# Patient Record
Sex: FEMALE | Race: WHITE | NOT HISPANIC OR LATINO | Employment: OTHER | URBAN - METROPOLITAN AREA
[De-identification: names, ages, dates, MRNs, and addresses within clinical notes are randomized per-mention and may not be internally consistent; named-entity substitution may affect disease eponyms.]

---

## 2019-12-23 ENCOUNTER — RESULTS ENCOUNTER (OUTPATIENT)
Dept: PAIN MEDICINE | Facility: CLINIC | Age: 84
End: 2019-12-23

## 2019-12-23 ENCOUNTER — OFFICE VISIT (OUTPATIENT)
Dept: PAIN MEDICINE | Facility: CLINIC | Age: 84
End: 2019-12-23

## 2019-12-23 VITALS
TEMPERATURE: 98.6 F | RESPIRATION RATE: 16 BRPM | HEART RATE: 83 BPM | SYSTOLIC BLOOD PRESSURE: 144 MMHG | DIASTOLIC BLOOD PRESSURE: 77 MMHG | HEIGHT: 64 IN | BODY MASS INDEX: 26.5 KG/M2 | WEIGHT: 155.2 LBS | OXYGEN SATURATION: 93 %

## 2019-12-23 DIAGNOSIS — M47.816 LUMBAR FACET ARTHROPATHY: ICD-10-CM

## 2019-12-23 DIAGNOSIS — G89.29 OTHER CHRONIC PAIN: Primary | ICD-10-CM

## 2019-12-23 DIAGNOSIS — M51.37 DEGENERATION OF LUMBAR OR LUMBOSACRAL INTERVERTEBRAL DISC: ICD-10-CM

## 2019-12-23 DIAGNOSIS — G89.29 OTHER CHRONIC PAIN: ICD-10-CM

## 2019-12-23 LAB
POC AMPHETAMINES: NEGATIVE
POC BARBITURATES: NEGATIVE
POC BENZODIAZEPHINES: NEGATIVE
POC COCAINE: NEGATIVE
POC METHADONE: NEGATIVE
POC METHAMPHETAMINE SCREEN URINE: NEGATIVE
POC OPIATES: NEGATIVE
POC OXYCODONE: NEGATIVE
POC PHENCYCLIDINE: NEGATIVE
POC PROPOXYPHENE: NEGATIVE
POC THC: NEGATIVE
POC TRICYCLIC ANTIDEPRESSANTS: NEGATIVE

## 2019-12-23 PROCEDURE — 80305 DRUG TEST PRSMV DIR OPT OBS: CPT | Performed by: NURSE PRACTITIONER

## 2019-12-23 PROCEDURE — 99204 OFFICE O/P NEW MOD 45 MIN: CPT | Performed by: NURSE PRACTITIONER

## 2019-12-23 NOTE — PATIENT INSTRUCTIONS
"-------  Education about Medial Branch Blockade and RF Therapy:    This medial branch blockade (MBB) suggested is intended for diagnostic purposes, with the intent of offering the patient Radiofrequency thermal rhizotomy (RF) if the MBB is diagnostically effective.  The diagnostic blockade is necessary to determine the likelihood that RF therapy could be efficacious in providing long term relief to the patient.    Medial branches are sensory nerve branches that connect to a facet joint and transmit sensations & pain signals from that joint.  Facet is a term for the type of joints found in the spine.  Medial branches are the nerves that go to a facet, and therefore are also sometimes called \"facet joint nerves\" (FJNs).      In a medial branch blockade procedure, xray fluoroscopy is used to verify the locations of the outside of the joint lines which are being targeted.  Under xray guidance, needles are placed to these areas.  Contrast dye is injected to confirm proper placement, with dye flowing over the joint area, and to ensure that the dye does not flow into unintended areas such as a vein.  When this is confirmed, local anesthetic is injected to block the medial branch at that joint level.      If MBBs are diagnostically successful in blocking pain, then the patient is most likely a great candidate for Radiofrequency of those facet joint nerves.  In the RF procedure, needles are placed to the joint lines in the same fashion, and after testing, the needle tips are heated to thermally treat the nerves, blocking the nerves by in essence damaging the nerves with the heat treatment.       Medically, a successful RF procedure should provide a patient with 50% pain relief or more for at least 6 months.  Clinical experience suggests that successful patients receive relief more in the range of 12 months on average.  We also discussed that a fortunate minority of patients receive therapeutic success from the MBB, and may " not require RF ablation.  If a patient receives more than 8 weeks of relief from MBB, then occasional repeat MBB for therapeutic purposes is a very reasonable alternative therapy.  This course of therapy is consistent with our LCDs according to our CMS  in the area, and therefore other insurance providers should follow accordingly.  We will monitor our patients to screen for these therapeutic responders and will offer RF therapy only when necessary.      We discussed that MBB & RF are not without risks.  Guidelines regarding anticoagulant use & neuraxial procedures will be respected.  Patients that are ill or otherwise may be at risk for sepsis will not have their spines accessed by neuraxial injections of any type.  This patient will not be offered these therapies if there is an increased risk.   We discussed that there is a risk of postprocedural pain and also a risk of worsening of clinical picture with these procedures as with any neuraxial procedure.    -------

## 2019-12-23 NOTE — PROGRESS NOTES
"CHIEF COMPLAINT  F/U back and hip pain- patient states that she has this pain for many years that has progressively worsened. She describes the pain as stabbing across her back with some stinging and burning down bilateral legs. Patient has been to PT and another pain management with [place] many years ago. She uses heat and ice to relieve pain and takes tylenol daily to diminish the pain.     Subjective   Esther Major is a 88 y.o. female.   She presents to the office for evaluation of back and hip pain. She was referred here by  [facility] (Dr. Reid).     Patient referred to our practice for low back and hip pain.  This pain is chronic is been present for many years, but has become progressively worse recently.  Has been through PT on multiple occasions.  Has had massage therapy.  Past pain management at [facility], had lumbar DENIS's (would have been at least 15 years ago).  She recalls benefit with the epidurals.  She is referred here to consider interventional options.      C/o bilateral low back and lumbosacral area pain, the pain radiates to bilateral thighs.  The pain today is 8/10 in severity. She states that the pain is always present, \"everything\" aggravates it - standing, sitting, lying down.  Sometimes lying down will help after prolonged standing.  She uses topical bio-freeze and Tylenol without much relief.  Says she was on pain medications for years including Hydrocodone, Tramadol, but nothing worked so she stopped taking it.      Recent left intra-articular hip injection, says NO benefit.      History of neck surgery in the 1960's.      Patient is retired.  Denies use of tobacco, alcohol, illicit substances.      History of Present Illness     PEG Assessment   What number best describes your pain on average in the past week?9  What number best describes how, during the past week, pain has interfered with your enjoyment of life?2  What number best describes how, during the past week, pain " has interfered with your general activity?  2      Current Outpatient Medications:   •  acetaminophen (TYLENOL) 650 MG 8 hr tablet, Take 650 mg by mouth 4 (four) times a day., Disp: , Rfl:   •  DULoxetine (CYMBALTA) 60 MG capsule, Take 60 mg by mouth daily., Disp: , Rfl:   •  enalapril (VASOTEC) 10 MG tablet, Take 10 mg by mouth daily., Disp: , Rfl:   •  ezetimibe (ZETIA) 10 MG tablet, Take 10 mg by mouth daily., Disp: , Rfl:   •  fenofibrate (TRICOR) 145 MG tablet, Take 145 mg by mouth daily., Disp: , Rfl:   •  levothyroxine (SYNTHROID, LEVOTHROID) 50 MCG tablet, Take 50 mcg by mouth daily., Disp: , Rfl:   •  Misc Natural Products (CALCIUM PLUS ADVANCED PO), Take  by mouth., Disp: , Rfl:   •  Multiple Vitamins-Minerals (CENTRUM SILVER ADULT 50+ PO), Take  by mouth., Disp: , Rfl:   •  Omega-3 Fatty Acids (FISH OIL) 1000 MG capsule capsule, Take 1,500 capsules by mouth daily with breakfast., Disp: , Rfl:   •  omeprazole (PriLOSEC) 20 MG capsule, Take 20 mg by mouth daily., Disp: , Rfl:   •  Probiotic Product (PROBIOTIC ADVANCED PO), Take  by mouth., Disp: , Rfl:     The following portions of the patient's history were reviewed and updated as appropriate: allergies, current medications, past family history, past medical history, past social history, past surgical history and problem list.    REVIEW OF PERTINENT MEDICAL DATA  Patient was referred to our practice for chronic low back pain.  I reviewed her referral packet which was sent over 12/11/2019 and scanned in the media section.  I reviewed the office note from [facility] physicians neurosurgery rehab encounter date 12/10/2019 where patient was following up on her low back pain after physical therapy and x-rays were ordered.  She reported no benefit from the physical therapy and that her pain was severe at a 10 out of 10 in severity.  X-rays of her hip showed nothing acute and x-rays of lumbar spine showed moderate degenerative changes.  She had a left intra-articular  "hip injection performed at that visit.  Recommend referral to pain management for evaluation of low back pain for possible facet component.            LAKHANI = 6    Review of Systems   Constitutional: Positive for activity change (decreased) and fatigue. Negative for chills and fever.   Respiratory: Negative for chest tightness and shortness of breath.    Cardiovascular: Negative for chest pain.   Gastrointestinal: Positive for abdominal pain and diarrhea. Negative for constipation.   Genitourinary: Negative for difficulty urinating, dyspareunia and dysuria.   Musculoskeletal: Positive for arthralgias and back pain.   Neurological: Positive for weakness. Negative for dizziness, light-headedness, numbness and headaches.   Psychiatric/Behavioral: Positive for agitation and sleep disturbance. Negative for self-injury and suicidal ideas. The patient is not nervous/anxious.      Vitals:    12/23/19 0805   BP: 144/77   Pulse: 83   Resp: 16   Temp: 98.6 °F (37 °C)   SpO2: 93%   Weight: 70.4 kg (155 lb 3.2 oz)   Height: 162.6 cm (64\")   PainSc:   8   PainLoc: Back     Objective   Physical Exam   Constitutional: She is oriented to person, place, and time. She appears well-developed and well-nourished. She is cooperative. No distress.   HENT:   Head: Normocephalic and atraumatic.   Nose: Nose normal.   Eyes: Pupils are equal, round, and reactive to light. Conjunctivae and lids are normal.   Neck: Trachea normal and normal range of motion. Neck supple.   Cardiovascular: Normal rate, regular rhythm and normal heart sounds.   Pulmonary/Chest: Effort normal and breath sounds normal. No respiratory distress.   Abdominal: Soft. Normal appearance and bowel sounds are normal. There is no tenderness. There is no guarding.   Musculoskeletal: She exhibits no deformity.        Lumbar back: She exhibits decreased range of motion, tenderness, bony tenderness and pain.   +lumbar facet tenderness/loading    Neurological: She is alert and " "oriented to person, place, and time. She has normal strength and normal reflexes. No cranial nerve deficit. Coordination and gait normal.   Reflex Scores:       Patellar reflexes are 2+ on the right side and 2+ on the left side.       Achilles reflexes are 2+ on the right side and 2+ on the left side.  Skin: Skin is warm, dry and intact. She is not diaphoretic.   Psychiatric: She has a normal mood and affect. Her speech is normal and behavior is normal. Judgment and thought content normal. Cognition and memory are normal.   Nursing note and vitals reviewed.    Assessment/Plan   Esther was seen today for back pain.    Diagnoses and all orders for this visit:    Other chronic pain    Lumbar facet arthropathy  -     Case Request    Degeneration of lumbar or lumbosacral intervertebral disc      --- Bilateral L2-L5 MBB X 2, 2-4 weeks apart  -------  Education about Medial Branch Blockade and RF Therapy:    This medial branch blockade (MBB) suggested is intended for diagnostic purposes, with the intent of offering the patient Radiofrequency thermal rhizotomy (RF) if the MBB is diagnostically effective.  The diagnostic blockade is necessary to determine the likelihood that RF therapy could be efficacious in providing long term relief to the patient.    Medial branches are sensory nerve branches that connect to a facet joint and transmit sensations & pain signals from that joint.  Facet is a term for the type of joints found in the spine.  Medial branches are the nerves that go to a facet, and therefore are also sometimes called \"facet joint nerves\" (FJNs).      In a medial branch blockade procedure, xray fluoroscopy is used to verify the locations of the outside of the joint lines which are being targeted.  Under xray guidance, needles are placed to these areas.  Contrast dye is injected to confirm proper placement, with dye flowing over the joint area, and to ensure that the dye does not flow into unintended areas such as " a vein.  When this is confirmed, local anesthetic is injected to block the medial branch at that joint level.      If MBBs are diagnostically successful in blocking pain, then the patient is most likely a great candidate for Radiofrequency of those facet joint nerves.  In the RF procedure, needles are placed to the joint lines in the same fashion, and after testing, the needle tips are heated to thermally treat the nerves, blocking the nerves by in essence damaging the nerves with the heat treatment.       Medically, a successful RF procedure should provide a patient with 50% pain relief or more for at least 6 months.  Clinical experience suggests that successful patients receive relief more in the range of 12 months on average.  We also discussed that a fortunate minority of patients receive therapeutic success from the MBB, and may not require RF ablation.  If a patient receives more than 8 weeks of relief from MBB, then occasional repeat MBB for therapeutic purposes is a very reasonable alternative therapy.  This course of therapy is consistent with our LCDs according to our CMS  in the area, and therefore other insurance providers should follow accordingly.  We will monitor our patients to screen for these therapeutic responders and will offer RF therapy only when necessary.      We discussed that MBB & RF are not without risks.  Guidelines regarding anticoagulant use & neuraxial procedures will be respected.  Patients that are ill or otherwise may be at risk for sepsis will not have their spines accessed by neuraxial injections of any type.  This patient will not be offered these therapies if there is an increased risk.   We discussed that there is a risk of postprocedural pain and also a risk of worsening of clinical picture with these procedures as with any neuraxial procedure.    -------    --- Routine UDS in office today as part of new patient evaluation.  The specimen was viewed and the  immunoassay result reviewed and is NEG.  This specimen will be sent to [facility] laboratory for confirmation.     --- May need SI joint injections in the future   --- Follow-up after procedure         YOLI REPORT  YOLI report has been reviewed and scanned into the patient's chart.    As the clinician, I personally reviewed the YOLI from 12/23/19 while the patient was in the office today.    EMR Dragon/Transcription disclaimer:   Much of this encounter note is an electronic transcription/translation of spoken language to printed text. The electronic translation of spoken language may permit erroneous, or at times, nonsensical words or phrases to be inadvertently transcribed; Although I have reviewed the note for such errors, some may still exist.

## 2020-03-12 ENCOUNTER — DOCUMENTATION (OUTPATIENT)
Dept: PAIN MEDICINE | Facility: CLINIC | Age: 85
End: 2020-03-12

## 2020-03-12 ENCOUNTER — OUTSIDE FACILITY SERVICE (OUTPATIENT)
Dept: PAIN MEDICINE | Facility: CLINIC | Age: 85
End: 2020-03-12

## 2020-03-12 PROCEDURE — 64495 INJ PARAVERT F JNT L/S 3 LEV: CPT | Performed by: ANESTHESIOLOGY

## 2020-03-12 PROCEDURE — 64493 INJ PARAVERT F JNT L/S 1 LEV: CPT | Performed by: ANESTHESIOLOGY

## 2020-03-12 PROCEDURE — 64494 INJ PARAVERT F JNT L/S 2 LEV: CPT | Performed by: ANESTHESIOLOGY

## 2020-03-13 NOTE — PROGRESS NOTES
Bilateral L2-5 Lumbar Medial Branch Blockade  [facility]    PREOPERATIVE DIAGNOSIS:  Lumbar spondylosis without myelopathy    POSTOPERATIVE DIAGNOSIS:  Lumbar spondylosis without myelopathy    PROCEDURE:   Diagnostic Bilateral Lumbar Medial Branch Nerve Blockades, with fluoroscopy:  L2, L3, L4, and L5 nerves (at the L3, L4, L5 transverse processes and the sacral alar groove) to block facet joints L3-4, L4-5, and L5-S1  1. 65891-89 -- Bilateral Lumbar Facet blocks, 1st Level  2. 25599-42 -- Bilateral Lumbar Facet blocks, 2nd  Level  3. 33465-65 -- Bilateral Lumbar Facet blocks, 3rd Level    PRE-PROCEDURE DISCUSSION WITH PATIENT:    Risks and complications were discussed with the patient prior to starting the procedure and informed consent was obtained.      SURGEON:  Ron Clements MD    REASON FOR PROCEDURE:   The patient complains of pain that seems to have a significant axial component and Tenderness of the affected facet joints on exam under fluoroscopy    SEDATION:  Patient declined administration of moderate sedation    ANESTHETIC:  Marcaine 0.5%  STEROID:  Methylprednisolone (DEPO MEDROL) 80mg/ml  TOTAL VOLUME OF SOLUTION: 8ml    DESCRIPTON OF PROCEDURE:  After obtaining informed consent, IV access was not obtained in the preoperative area.   The patient was taken to the operating room.  The patient was placed in the prone position with a pillow under the abdomen. All pressure points were well padded.  EKG, blood pressure, and pulse oximeter were monitored.  The patient was monitored and sedated by the RN under my direction. The lumbosacral area was prepped with Chloraprep and draped in a sterile fashion. Under fluoroscopic guidance the transverse processes of the L3, L4, and L5 vertebrae at the junctions of the superior articular processes were identified on the right. Also identified was the groove between the ala and the superior articular process of the sacrum on the ipsilateral side.   Skin and subcutaneous tissue were anesthetized with 1% lidocaine above each of these points. A 22-gauge spinal needle was introduced under fluoroscopic guidance at the above junctions. Aspiration was negative for blood and CSF.  After confirming the position of the needle with fluoroscope in all views, 0.25 mL of Omnipaque was injected, and after seeing the proper spread a total of 1 mL of the anesthetic solution noted above was injected at each of these points.  Needles were removed intact from each of the areas.  A similar procedure was repeated to block the L2, L3, L4, and L5 nerves on the contralateral side.   Onset of analgesia was noted.  Vital signs remained stable throughout.      ESTIMATED BLOOD LOSS:  <5 mL  SPECIMENS:  none    COMPLICATIONS:   No complications were noted., There was no indication of vascular uptake on live injection of contrast dye. and The patient did not have any signs of postprocedure numbness nor weakness.    TOLERANCE & DISCHARGE CONDITION:    The patient tolerated the procedure well.  The patient was transported to the recovery area without difficulties.  The patient was discharged to home under the care of family in stable and satisfactory condition.    PLAN OF CARE:  1. The patient was given our standard instruction sheet.  2. We discussed that Lumbar Medial Branch Blockade is a diagnostic procedure in consideration for radiofrequency ablation if two diagnostic procedures prove to be positive for significant benefit.  If sustained relief of 6 to eight weeks is obtained, then an alternative plan could be therapeutic lumbar branch blockades.  3. The patient is asked to keep a pain log each hour for 8 hours after the procedure today.  4. The patient will  Repeat injection 2-4 wks.  5. The patient will resume all medications as per the medication reconciliation sheet.

## 2020-06-22 ENCOUNTER — TELEPHONE (OUTPATIENT)
Dept: GASTROENTEROLOGY | Facility: CLINIC | Age: 85
End: 2020-06-22

## 2020-06-22 NOTE — TELEPHONE ENCOUNTER
Patient called and would like to be seen. She states that she was had abd pain last week and passing bloody mucus. Pain went away after she passed the bloody mucus. Pain was in lower abdomen and rectal area. States that it only happened the one time. Patient does not want to see SUKUMAR. Only wants to see Dr Hobson. Please advise.

## 2020-06-23 NOTE — PROGRESS NOTES
Abdominal Pain and Rectal Bleeding      HPI  88-year old female presents to the office today for follow up. She was a patient in our previous practice. She reports having new abdominal pain in the center of her abdomen, that she describes as sharp, dull, and crampy. Pain does not improve following a BM. She reports passing bloody mucous per rectum on Thursday night. Pain subsided after passing the bloody mucous. She reports some fecal urgency postprandially. She reports frequent abdominal bloating. She reports a history of colon polyps. She denies any weight loss. She reports having a colonoscopy in January 2019. Report is not available but we will request.     She reports a recent diagnosis of renal cell carcinoma February 1, 2020 following CT scan . She reports that the left kidney lesion is 7 mm and the lesion in the right kidney is 1.7 cm. No plans for surgery at this time. She follows with an MD in [place], [place].       Review of Systems   Constitutional: Negative for appetite change, chills, diaphoresis, fatigue, fever and unexpected weight change.   HENT: Positive for voice change. Negative for dental problem, ear pain, mouth sores, rhinorrhea and sore throat.    Eyes: Negative for pain, redness and visual disturbance.   Respiratory: Negative for cough, chest tightness and wheezing.    Cardiovascular: Negative for chest pain, palpitations and leg swelling.   Endocrine: Negative for cold intolerance, heat intolerance, polydipsia, polyphagia and polyuria.   Genitourinary: Negative for dysuria, frequency, hematuria and urgency.   Musculoskeletal: Positive for back pain and myalgias. Negative for arthralgias, joint swelling and neck pain.   Skin: Negative for rash.   Allergic/Immunologic: Positive for environmental allergies. Negative for food allergies and immunocompromised state.   Neurological: Negative for dizziness, seizures, weakness, numbness and headaches.   Hematological: Does not bruise/bleed  easily.   Psychiatric/Behavioral: Negative for sleep disturbance. The patient is not nervous/anxious.         I have reviewed and confirmed the accuracy of the HPI and ROS as documented by the APRN Oh Hobson MD     Problem List:  There is no problem list on file for this patient.      Medical History:    Past Medical History:   Diagnosis Date   • Fibromyalgia    • GERD (gastroesophageal reflux disease)    • Hyperlipidemia    • Hypertension    • IBS (irritable bowel syndrome)         Social History:    Social History     Socioeconomic History   • Marital status:      Spouse name: Not on file   • Number of children: Not on file   • Years of education: Not on file   • Highest education level: Not on file   Tobacco Use   • Smoking status: Never Smoker   Substance and Sexual Activity   • Alcohol use: No   • Drug use: No   • Sexual activity: Defer       Family History:   Family History   Problem Relation Age of Onset   • Heart failure Mother    • Heart failure Father    • Hypertension Brother        Surgical History:   Past Surgical History:   Procedure Laterality Date   • NECK SURGERY           Current Outpatient Medications:   •  DULoxetine (CYMBALTA) 60 MG capsule, Take 60 mg by mouth daily., Disp: , Rfl:   •  enalapril (VASOTEC) 10 MG tablet, Take 10 mg by mouth daily., Disp: , Rfl:   •  ezetimibe (ZETIA) 10 MG tablet, Take 10 mg by mouth daily., Disp: , Rfl:   •  fenofibrate (TRICOR) 145 MG tablet, Take 145 mg by mouth daily., Disp: , Rfl:   •  Misc Natural Products (CALCIUM PLUS ADVANCED PO), Take  by mouth., Disp: , Rfl:   •  Multiple Vitamins-Minerals (CENTRUM SILVER ADULT 50+ PO), Take  by mouth., Disp: , Rfl:   •  Omega-3 Fatty Acids (FISH OIL) 1000 MG capsule capsule, Take 1,500 capsules by mouth daily with breakfast., Disp: , Rfl:   •  omeprazole (PriLOSEC) 20 MG capsule, Take 20 mg by mouth daily., Disp: , Rfl:   •  Probiotic Product (PROBIOTIC ADVANCED PO), Take  by mouth., Disp: , Rfl:   •   acetaminophen (TYLENOL) 650 MG 8 hr tablet, Take 650 mg by mouth 4 (four) times a day., Disp: , Rfl:     Allergies:   Allergies   Allergen Reactions   • Gluten Meal GI Intolerance   • Latex         The following portions of the patient's history were reviewed and updated as appropriate: allergies, current medications, past family history, past medical history, past social history, past surgical history and problem list.    Vitals:    06/24/20 1315   BP: 122/74   Pulse: 78   Temp: 97.7 °F (36.5 °C)   SpO2: 96%         06/24/20  1315   Weight: 68.9 kg (152 lb)     Body mass index is 26.08 kg/m².      PHYSICAL EXAM:  Physical Exam   Abdominal: Soft. Normal appearance and bowel sounds are normal. She exhibits distension. She exhibits no pulsatile midline mass and no mass. There is no tenderness. There is no rigidity, no rebound and no guarding.   Vitals reviewed.          Assessment/ Plan  Esther was seen today for abdominal pain and rectal bleeding.    Diagnoses and all orders for this visit:    Periumbilical abdominal pain    Bloating    Mucous in stools    Rectal bleeding         Return in about 4 weeks (around 7/22/2020).    Patient Instructions   1. For further evaluation of abdominal pain, we will order a CT scan of the abdomen and pelvis.     2. We will also obtain your previous colonoscopy and pathology report for our review.        Documentation by Joellen PATINO acting as a scribe in the following sections (ROS, HPI, Assessment, Plan) for the undersigned provider.     Discussion:  We will ask for old records from prior colonoscopy which was done in January 2019 to review.  Unclear new symptoms of periumbilical abdominal pain are related to possible inflammatory condition such as colitis or diverticulitis.  She also has abdominal bloating and distention raising the possibility of some type of bacterial overgrowth.  She also has some functional issues with abdominal cramping and urgency.  We will go ahead  and order a CT scan.  We will follow-up with her after the CT is done.    Previous records were received and reviewed.  Patient underwent a flexible sigmoidoscopy on 1/3/2019 which revealed a moderate amount of solid stool, sigmoid diverticulosis, and mild grade 1 nonbleeding internal hemorrhoids. Vandana PATINO

## 2020-06-24 ENCOUNTER — OFFICE VISIT (OUTPATIENT)
Dept: GASTROENTEROLOGY | Facility: CLINIC | Age: 85
End: 2020-06-24

## 2020-06-24 VITALS
TEMPERATURE: 97.7 F | OXYGEN SATURATION: 96 % | HEIGHT: 64 IN | HEART RATE: 78 BPM | DIASTOLIC BLOOD PRESSURE: 74 MMHG | SYSTOLIC BLOOD PRESSURE: 122 MMHG | WEIGHT: 152 LBS | BODY MASS INDEX: 25.95 KG/M2

## 2020-06-24 DIAGNOSIS — R19.5 MUCOUS IN STOOLS: ICD-10-CM

## 2020-06-24 DIAGNOSIS — R14.0 BLOATING: ICD-10-CM

## 2020-06-24 DIAGNOSIS — R10.33 PERIUMBILICAL ABDOMINAL PAIN: Primary | ICD-10-CM

## 2020-06-24 DIAGNOSIS — K62.5 RECTAL BLEEDING: ICD-10-CM

## 2020-06-24 PROCEDURE — 99214 OFFICE O/P EST MOD 30 MIN: CPT | Performed by: INTERNAL MEDICINE

## 2020-06-24 NOTE — PATIENT INSTRUCTIONS
1. For further evaluation of abdominal pain, we will order a CT scan of the abdomen and pelvis.     2. We will also obtain your previous colonoscopy and pathology report for our review.

## 2020-07-01 ENCOUNTER — HOSPITAL ENCOUNTER (OUTPATIENT)
Dept: CT IMAGING | Facility: HOSPITAL | Age: 85
Discharge: HOME | End: 2020-07-01
Admitting: INTERNAL MEDICINE

## 2020-07-01 DIAGNOSIS — R14.0 BLOATING: ICD-10-CM

## 2020-07-01 DIAGNOSIS — K62.5 RECTAL BLEEDING: ICD-10-CM

## 2020-07-01 DIAGNOSIS — R19.5 MUCOUS IN STOOLS: ICD-10-CM

## 2020-07-01 DIAGNOSIS — R10.33 PERIUMBILICAL ABDOMINAL PAIN: ICD-10-CM

## 2020-07-01 LAB — CREAT BLDA-MCNC: 1 MG/DL (ref 0.6–1.3)

## 2020-07-01 PROCEDURE — 74177 CT ABD & PELVIS W/CONTRAST: CPT

## 2020-07-01 PROCEDURE — 82565 ASSAY OF CREATININE: CPT

## 2020-07-01 PROCEDURE — 25010000002 IOPAMIDOL 61 % SOLUTION: Performed by: INTERNAL MEDICINE

## 2020-07-01 PROCEDURE — 0 DIATRIZOATE MEGLUMINE & SODIUM PER 1 ML: Performed by: INTERNAL MEDICINE

## 2020-07-01 RX ADMIN — IOPAMIDOL 85 ML: 612 INJECTION, SOLUTION INTRAVENOUS at 10:31

## 2020-07-01 RX ADMIN — DIATRIZOATE MEGLUMINE AND DIATRIZOATE SODIUM 30 ML: 660; 100 LIQUID ORAL; RECTAL at 09:19

## 2020-07-08 ENCOUNTER — OFFICE VISIT (OUTPATIENT)
Dept: GASTROENTEROLOGY | Facility: CLINIC | Age: 85
End: 2020-07-08

## 2020-07-08 VITALS
DIASTOLIC BLOOD PRESSURE: 62 MMHG | HEART RATE: 78 BPM | TEMPERATURE: 97.1 F | BODY MASS INDEX: 25.78 KG/M2 | SYSTOLIC BLOOD PRESSURE: 112 MMHG | HEIGHT: 64 IN | WEIGHT: 151 LBS | OXYGEN SATURATION: 97 %

## 2020-07-08 DIAGNOSIS — R93.3 ABNORMAL CT SCAN, COLON: Primary | ICD-10-CM

## 2020-07-08 DIAGNOSIS — K62.5 RECTAL BLEEDING: ICD-10-CM

## 2020-07-08 DIAGNOSIS — R10.33 PERIUMBILICAL ABDOMINAL PAIN: ICD-10-CM

## 2020-07-08 DIAGNOSIS — R19.7 DIARRHEA, UNSPECIFIED TYPE: ICD-10-CM

## 2020-07-08 PROCEDURE — 99214 OFFICE O/P EST MOD 30 MIN: CPT | Performed by: NURSE PRACTITIONER

## 2020-07-08 RX ORDER — LEVOTHYROXINE SODIUM 0.05 MG/1
50 TABLET ORAL DAILY
COMMUNITY

## 2020-07-08 NOTE — PROGRESS NOTES
Chief Complaint   Patient presents with   • Follow-up   • Imaging Only     Discuss CT Scan results        HPI  Patient is an 88-year-old female who presents today for follow-up.    She was seen in the office June 24, 2020 for abdominal pain and bloody mucus in stool.  She has a history of renal cell carcinoma.  CT scan was recommended and was performed July 1, 2020.  There were no acute findings noted.  Redemonstration of renal masses was seen.  CT showed hepatic steatosis, pelvic floor weakening with small cystocele, small right renal artery aneurysm, small sliding hiatal hernia.  They also saw a suspicious area of eccentric wall thickening at the hepatic flexure of the colon.    Patient reports for around 1 week after her CT scan she was experienced cramping and diarrhea.  This developed almost immediately after drinking the oral contrast for the CT scan.  She took Imodium to help manage this.  She also followed a bland diet.  Over the last 2 days, the diarrhea seems to have resolved and her bowels are starting to normalize.  She has had improvement in the abdominal cramping.    Prior to her last office visit, she experienced an episode of abdominal pain located to the mid abdomen followed by an episode of rectal bleeding.  She reports the symptoms have since resolved.    She denies any nausea or vomiting.  Denies any heartburn or reflux.    She presents today for follow-up after CT scan and to discuss colonoscopy.    Review of Systems   Constitutional: Negative for appetite change, chills, diaphoresis, fatigue, fever and unexpected weight change.   HENT: Negative for dental problem, ear pain, mouth sores, rhinorrhea, sore throat and voice change.    Eyes: Negative for pain, redness and visual disturbance.   Respiratory: Negative for cough, chest tightness and wheezing.    Cardiovascular: Negative for chest pain, palpitations and leg swelling.   Endocrine: Negative for cold intolerance, heat intolerance,  polydipsia, polyphagia and polyuria.   Genitourinary: Negative for dysuria, frequency, hematuria and urgency.   Musculoskeletal: Positive for back pain and myalgias. Negative for arthralgias, joint swelling and neck pain.   Skin: Negative for rash.   Allergic/Immunologic: Positive for environmental allergies. Negative for food allergies and immunocompromised state.   Neurological: Negative for dizziness, seizures, weakness, numbness and headaches.   Hematological: Does not bruise/bleed easily.   Psychiatric/Behavioral: Negative for sleep disturbance. The patient is not nervous/anxious.         Problem List:  There is no problem list on file for this patient.      Medical History:    Past Medical History:   Diagnosis Date   • Fibromyalgia    • GERD (gastroesophageal reflux disease)    • Hyperlipidemia    • Hypertension    • IBS (irritable bowel syndrome)         Social History:    Social History     Socioeconomic History   • Marital status:      Spouse name: Not on file   • Number of children: Not on file   • Years of education: Not on file   • Highest education level: Not on file   Tobacco Use   • Smoking status: Never Smoker   Substance and Sexual Activity   • Alcohol use: No   • Drug use: No   • Sexual activity: Defer       Family History:   Family History   Problem Relation Age of Onset   • Heart failure Mother    • Heart failure Father    • Hypertension Brother        Surgical History:   Past Surgical History:   Procedure Laterality Date   • NECK SURGERY           Current Outpatient Medications:   •  acetaminophen (TYLENOL) 650 MG 8 hr tablet, Take 650 mg by mouth 4 (four) times a day., Disp: , Rfl:   •  DULoxetine (CYMBALTA) 60 MG capsule, Take 60 mg by mouth daily., Disp: , Rfl:   •  enalapril (VASOTEC) 10 MG tablet, Take 10 mg by mouth daily., Disp: , Rfl:   •  ezetimibe (ZETIA) 10 MG tablet, Take 10 mg by mouth daily., Disp: , Rfl:   •  fenofibrate (TRICOR) 145 MG tablet, Take 145 mg by mouth daily.,  Disp: , Rfl:   •  levothyroxine (SYNTHROID, LEVOTHROID) 50 MCG tablet, Take 50 mcg by mouth Daily., Disp: , Rfl:   •  Misc Natural Products (CALCIUM PLUS ADVANCED PO), Take  by mouth., Disp: , Rfl:   •  Multiple Vitamins-Minerals (CENTRUM SILVER ADULT 50+ PO), Take  by mouth., Disp: , Rfl:   •  Omega-3 Fatty Acids (FISH OIL) 1000 MG capsule capsule, Take 1,500 capsules by mouth daily with breakfast., Disp: , Rfl:   •  omeprazole (PriLOSEC) 20 MG capsule, Take 20 mg by mouth daily., Disp: , Rfl:   •  Probiotic Product (PROBIOTIC ADVANCED PO), Take  by mouth., Disp: , Rfl:     Allergies:  Gluten meal and Latex    The following portions of the patient's history were reviewed and updated as appropriate: allergies, current medications, past family history, past medical history, past social history, past surgical history and problem list.    Vitals:    07/08/20 0841   BP: 112/62   Pulse: 78   Temp: 97.1 °F (36.2 °C)   SpO2: 97%         07/08/20  0841   Weight: 68.5 kg (151 lb)     Body mass index is 25.91 kg/m².    Physical Exam   Constitutional: She is oriented to person, place, and time. She appears well-developed and well-nourished. No distress.   HENT:   Head: Normocephalic and atraumatic.   Eyes: No scleral icterus.   Cardiovascular: Normal rate and regular rhythm.   Pulmonary/Chest: Effort normal and breath sounds normal. No respiratory distress.   Abdominal: Soft. Bowel sounds are normal. There is no tenderness.   Mild gaseous distention noted.   Neurological: She is alert and oriented to person, place, and time.   Skin: Skin is warm and dry.   Psychiatric: She has a normal mood and affect. Thought content normal.   Vitals reviewed.        Assessment/ Plan  Esther was seen today for follow-up and imaging only.    Diagnoses and all orders for this visit:    Abnormal CT scan, colon    Diarrhea, unspecified type    Periumbilical abdominal pain    Rectal bleeding         Return if symptoms worsen or fail to  improve.    There are no Patient Instructions on file for this visit.     Discussion:  Discussed patient today diarrhea was likely secondary to the oral contrast administered for the CT scan.  As this has resolved, no further testing or treatment needed at this time and recommended to continue to monitor.    Regarding her recent episode of rectal bleeding, discussed that tid may have been hemorrhoidal.  Also discussed consideration that it may have been secondary to a colitis that has since resolved.    We reviewed CT findings at today's office visit, discussed thickening seen on CT scan.  Discussed uncertain etiology of this, could represent focal inflammation, colon mass including colon cancer, or may have been secondary to collapsed mucosal fold.  We discussed colonoscopy for further evaluation.  Discussed that due to advanced age, she would be at higher risk for procedure including increased risk of bowel prep which could include dehydration and electrolyte abnormalities, increased risk of sedation which could include respiratory issues, and increased risk of procedure itself for both bleeding and perforation.  After our discussion and as she is feeling better, patient declines colonoscopy at this time.  She would prefer to continue to monitor her symptoms and due to her age, does not wish to undergo invasive procedures.    Patient instructed to contact the office for any new or worsening GI symptoms and may follow-up on an as needed basis.

## 2021-06-09 ENCOUNTER — TELEPHONE (OUTPATIENT)
Dept: CARDIAC SURGERY | Facility: CLINIC | Age: 86
End: 2021-06-09

## 2021-06-09 NOTE — TELEPHONE ENCOUNTER
PER AMALIA AT DR [facility]ERR[facility] [phone], PT'S FAMILY MEMBER CALLED THEIR OFFICE STATING PT FELL AT HOME AND IS GOING TO THE ER. PT WILL NEED TO CANCEL NEW PT APPT W/ASAF PATINO FOR 6/9/21 AT 1:30PM AND THE FAMILY WILL EITHER CALL OUR OFFICE OR THE UROLOGY OFFICE TO R/S.

## 2021-07-07 ENCOUNTER — OFFICE VISIT (OUTPATIENT)
Dept: CARDIAC SURGERY | Facility: CLINIC | Age: 86
End: 2021-07-07

## 2021-07-07 VITALS
HEART RATE: 85 BPM | BODY MASS INDEX: 25.61 KG/M2 | TEMPERATURE: 97.5 F | HEIGHT: 64 IN | DIASTOLIC BLOOD PRESSURE: 70 MMHG | OXYGEN SATURATION: 93 % | RESPIRATION RATE: 20 BRPM | WEIGHT: 150 LBS | SYSTOLIC BLOOD PRESSURE: 111 MMHG

## 2021-07-07 DIAGNOSIS — I77.810 AORTIC ECTASIA, THORACIC (HCC): Primary | ICD-10-CM

## 2021-07-07 PROCEDURE — 99213 OFFICE O/P EST LOW 20 MIN: CPT | Performed by: NURSE PRACTITIONER

## 2021-07-07 NOTE — PROGRESS NOTES
7/7/2021      Subjective:      Ciara Smith MD    Chief Complaint: Ascending aortic ectasia    History of Present Illness:       Dear Ciara Pete MD and Colleagues,    It was nice to see Esther Major in consultation at your request, Dr. Sanchez has asked that I see her for her initial consultation.  She is a 89 y.o. female with a history of renal cell carcinoma that undergoes routine surveillance for metastatic disease via CTs of the chest and abdomen and an incidental finding of ascending aortic dilatation was discovered on this years screening.  The CT of chest on 4/7/2021 was reviewed and measures maximally at 3.8 cm in the axial view by my measurements.  Incidental findings include stable 4 mm noncalcified left lower lobe nodule and stable 4 mm right lower lobe nodule.  She denies shortness of breath, chest/back pain, numbness/tingling/pain of extremities.  No vascular deficiencies or hyperextensible or hypermobile joints are noted on exam.  The patient's family history is negative for aneurysms or dissections, negative for connective tissue disease, and positive with her mother having some form of coronary disease in first degree family members.  The patient's primary concern was that she would not be precluded from going on a hot airplane ride with her grandchildren for her 89th birthday, I assured her that that was not an issue.    Primary medical history: Renal cell carcinoma, hypertension, hyperlipidemia, hypothyroidism, GERD, fibromyalgia    Surgical history: Neck surgery    Social history: Denies smoking, denies EtOH, denies caffeine, denies illicit use.  Lives alone, cares for herself and still drives, she does have limited mobility due to chronic back issues as she states that she has frequent falls so she does utilize a walker      There is no problem list on file for this patient.      Past Medical History:   Diagnosis Date   • Fibromyalgia    • GERD (gastroesophageal reflux  disease)    • Hyperlipidemia    • Hypertension    • IBS (irritable bowel syndrome)    • Renal cancer (CMS/HCC)        Past Surgical History:   Procedure Laterality Date   • NECK SURGERY         Allergies   Allergen Reactions   • Lactose Intolerance (Gi) Swelling   • Gluten Meal GI Intolerance   • Latex          Current Outpatient Medications:   •  acetaminophen (TYLENOL) 650 MG 8 hr tablet, Take 650 mg by mouth 4 (four) times a day., Disp: , Rfl:   •  ascorbic acid (VITAMIN C) 1000 MG tablet, Take 1 tablet by mouth Daily., Disp: , Rfl:   •  DULoxetine (CYMBALTA) 60 MG capsule, Take 60 mg by mouth daily., Disp: , Rfl:   •  enalapril (VASOTEC) 10 MG tablet, Take 10 mg by mouth daily., Disp: , Rfl:   •  ezetimibe (ZETIA) 10 MG tablet, Take 10 mg by mouth daily., Disp: , Rfl:   •  levothyroxine (SYNTHROID, LEVOTHROID) 50 MCG tablet, Take 50 mcg by mouth Daily., Disp: , Rfl:   •  Magnesium Oxide 200 MG tablet, Take 200 mg by mouth Daily., Disp: , Rfl:   •  Misc Natural Products (CALCIUM PLUS ADVANCED PO), Take  by mouth., Disp: , Rfl:   •  Multiple Vitamins-Minerals (CENTRUM SILVER ADULT 50+ PO), Take  by mouth., Disp: , Rfl:   •  Omega-3 Fatty Acids (FISH OIL) 1000 MG capsule capsule, Take 1,500 capsules by mouth daily with breakfast., Disp: , Rfl:   •  omeprazole (PriLOSEC) 20 MG capsule, Take 20 mg by mouth daily., Disp: , Rfl:   •  Probiotic Product (PROBIOTIC ADVANCED PO), Take  by mouth., Disp: , Rfl:     Social History     Socioeconomic History   • Marital status:      Spouse name: Not on file   • Number of children: Not on file   • Years of education: Not on file   • Highest education level: Not on file   Tobacco Use   • Smoking status: Never Smoker   • Smokeless tobacco: Never Used   Vaping Use   • Vaping Use: Never used   Substance and Sexual Activity   • Alcohol use: No   • Drug use: No   • Sexual activity: Defer       Family History   Problem Relation Age of Onset   • Heart failure Mother    • Heart  failure Father    • Hypertension Brother        The following portions of the patient's history were reviewed and updated as appropriate: allergies, current medications, past family history, past medical history, past social history, past surgical history and problem list.    Review of Systems:  Review of Systems   Constitutional: Negative for activity change and fatigue.   Respiratory: Negative for apnea, chest tightness and shortness of breath.    Cardiovascular: Negative for chest pain and palpitations.   Gastrointestinal: Negative for nausea and vomiting.   Musculoskeletal: Positive for back pain and gait problem.   Skin: Negative for color change and pallor.   Neurological: Negative for dizziness, syncope, weakness, light-headedness and numbness.   All other systems reviewed and are negative.      Physical Exam:    Vital Signs:  Weight: 68 kg (150 lb)   Body mass index is 25.75 kg/m².  Temp: 97.5 °F (36.4 °C)   Heart Rate: 85   BP: 111/70     Constitutional:       Appearance: Healthy appearance. Well-developed and well-groomed.   Neck:      Vascular: Normal carotid pulses. No carotid bruit or JVD.   Pulmonary:      Effort: Pulmonary effort is normal.      Breath sounds: Normal breath sounds. No decreased breath sounds. No wheezing. No rhonchi. No rales.   Cardiovascular:      Normal rate. Regular rhythm.      Murmurs: There is no murmur.      No gallop. No rub.   Pulses:     Carotid: 2+ bilaterally.     Radial: 2+ bilaterally.     Posterior tibial: 2+ bilaterally.  Edema:     Peripheral edema absent.   Abdominal:      General: Bowel sounds are normal. There is no distension.      Palpations: Abdomen is soft. There is no abdominal mass.      Tenderness: There is no abdominal tenderness. There is no guarding.   Skin:     General: Skin is warm and dry.      Coloration: Skin is not pale.      Findings: No erythema or rash.   Neurological:      Mental Status: Alert, oriented to person, place, and time and oriented  to person, place and time.   Psychiatric:         Attention and Perception: Attention normal.         Mood and Affect: Mood normal.         Speech: Speech normal.         Behavior: Behavior normal. Behavior is cooperative.         Thought Content: Thought content normal.         Judgment: Judgment normal.          Assessment:     1.  Ascending aortic ectasia 3.8 cm  2.  Hypertension----well-controlled  3.  Hyperlipidemia----Zetia and fish oil  4.  Renal cell carcinoma----no metastases by report, continued surveillance by urology      Recommendation/Plan:     Repeat CT chest without contrast in 1 year, can coordinate with urology scans to reduce radiation exposure    We discussed the importance of avoidance of over the counter decongestants and stimulants, specifically pseudoephedrine, for hypertension and aneurysm management    Risk factor modification of hypertension with a goal blood pressure less than 130/80, hyperlipidemia optimization, and continued avoidance of tobacco/nicotine products.       Although risk of rupture is low, emergency department presentation is warranted for acute chest, back, or abdominal pain, syncope, or stroke like symptoms    Thank you for allowing us to participate in her care.    Regards,    SUKUMAR Valle    ** all problems new to this practitioner, extensive review of records prior and during patient interview, >25 minutes in face to face conversation regarding treatment plan, education, and answering any questions the patient may have had

## 2021-07-08 PROBLEM — I77.810 AORTIC ECTASIA, THORACIC (HCC): Status: ACTIVE | Noted: 2021-07-08

## 2022-07-06 DIAGNOSIS — I77.810 AORTIC ECTASIA, THORACIC: Primary | ICD-10-CM

## 2022-08-22 ENCOUNTER — HOSPITAL ENCOUNTER (OUTPATIENT)
Dept: CT IMAGING | Facility: HOSPITAL | Age: 87
Discharge: HOME | End: 2022-08-22
Admitting: NURSE PRACTITIONER

## 2022-08-22 DIAGNOSIS — I77.810 AORTIC ECTASIA, THORACIC: ICD-10-CM

## 2022-08-22 PROCEDURE — 71250 CT THORAX DX C-: CPT

## 2022-08-24 ENCOUNTER — TELEPHONE (OUTPATIENT)
Dept: CARDIAC SURGERY | Facility: CLINIC | Age: 87
End: 2022-08-24

## 2023-02-07 NOTE — TELEPHONE ENCOUNTER
Left voicemail for pt to return call to discuss recent CT scan. Per SUKUMAR Cormier, pt does not need follow up scans of appts.     8/23/22 11:31am- Notified pt that she does not need follow up CT scans or appts with our office. Pt verbalized understanding and agreed.   
Admission